# Patient Record
Sex: FEMALE | Race: BLACK OR AFRICAN AMERICAN | Employment: OTHER
[De-identification: names, ages, dates, MRNs, and addresses within clinical notes are randomized per-mention and may not be internally consistent; named-entity substitution may affect disease eponyms.]

---

## 2024-11-16 ENCOUNTER — APPOINTMENT (OUTPATIENT)
Facility: HOSPITAL | Age: 59
DRG: 871 | End: 2024-11-16
Payer: OTHER GOVERNMENT

## 2024-11-16 ENCOUNTER — HOSPITAL ENCOUNTER (INPATIENT)
Facility: HOSPITAL | Age: 59
LOS: 1 days | Discharge: OTHER INSTITUTION | DRG: 871 | End: 2024-11-18
Attending: EMERGENCY MEDICINE | Admitting: INTERNAL MEDICINE
Payer: OTHER GOVERNMENT

## 2024-11-16 DIAGNOSIS — R65.20 SEVERE SEPSIS (HCC): ICD-10-CM

## 2024-11-16 DIAGNOSIS — A41.9 SEVERE SEPSIS (HCC): ICD-10-CM

## 2024-11-16 DIAGNOSIS — J90 PLEURAL EFFUSION: Primary | ICD-10-CM

## 2024-11-16 LAB
ALBUMIN SERPL-MCNC: 3.5 G/DL (ref 3.4–5)
ALBUMIN/GLOB SERPL: 0.6 (ref 0.8–1.7)
ALP SERPL-CCNC: 255 U/L (ref 45–117)
ALT SERPL-CCNC: 29 U/L (ref 13–56)
AMPHET UR QL SCN: NEGATIVE
ANION GAP BLD CALC-SCNC: ABNORMAL MMOL/L (ref 10–20)
ANION GAP SERPL CALC-SCNC: 15 MMOL/L (ref 3–18)
APPEARANCE UR: CLEAR
ARTERIAL PATENCY WRIST A: POSITIVE
AST SERPL-CCNC: 17 U/L (ref 10–38)
BACTERIA URNS QL MICRO: ABNORMAL /HPF
BARBITURATES UR QL SCN: NEGATIVE
BASE DEFICIT BLD-SCNC: 2.1 MMOL/L
BASOPHILS # BLD: 0 K/UL (ref 0–0.1)
BASOPHILS NFR BLD: 0 % (ref 0–2)
BDY SITE: ABNORMAL
BENZODIAZ UR QL: NEGATIVE
BILIRUB SERPL-MCNC: 0.7 MG/DL (ref 0.2–1)
BILIRUB UR QL: NEGATIVE
BUN SERPL-MCNC: 14 MG/DL (ref 7–18)
BUN/CREAT SERPL: 17 (ref 12–20)
CA-I BLD-MCNC: 1.26 MMOL/L (ref 1.15–1.33)
CALCIUM SERPL-MCNC: 10.4 MG/DL (ref 8.5–10.1)
CANNABINOIDS UR QL SCN: NEGATIVE
CHLORIDE BLD-SCNC: 97 MMOL/L (ref 98–107)
CHLORIDE SERPL-SCNC: 93 MMOL/L (ref 100–111)
CK SERPL-CCNC: 68 U/L (ref 26–192)
CO2 BLD-SCNC: 22 MMOL/L (ref 22–29)
CO2 SERPL-SCNC: 24 MMOL/L (ref 21–32)
COCAINE UR QL SCN: NEGATIVE
COLOR UR: YELLOW
CREAT BLD-MCNC: 0.5 MG/DL (ref 0.6–1.3)
CREAT SERPL-MCNC: 0.81 MG/DL (ref 0.6–1.3)
DIFFERENTIAL METHOD BLD: ABNORMAL
EOSINOPHIL # BLD: 0 K/UL (ref 0–0.4)
EOSINOPHIL NFR BLD: 0 % (ref 0–5)
EPITH CASTS URNS QL MICRO: ABNORMAL /LPF (ref 0–5)
ERYTHROCYTE [DISTWIDTH] IN BLOOD BY AUTOMATED COUNT: 12 % (ref 11.6–14.5)
ETHANOL SERPL-MCNC: <3 MG/DL (ref 0–3)
FLUAV RNA SPEC QL NAA+PROBE: NOT DETECTED
FLUBV RNA SPEC QL NAA+PROBE: NOT DETECTED
GAS FLOW.O2 O2 DELIVERY SYS: ABNORMAL
GLOBULIN SER CALC-MCNC: 6.2 G/DL (ref 2–4)
GLUCOSE BLD STRIP.AUTO-MCNC: 305 MG/DL (ref 70–110)
GLUCOSE BLD-MCNC: 361 MG/DL (ref 74–99)
GLUCOSE SERPL-MCNC: 317 MG/DL (ref 74–99)
GLUCOSE UR STRIP.AUTO-MCNC: >1000 MG/DL
HCO3 BLD-SCNC: 22.6 MMOL/L (ref 21–28)
HCT VFR BLD AUTO: 41.6 % (ref 35–45)
HGB BLD-MCNC: 13.2 G/DL (ref 12–16)
HGB UR QL STRIP: NEGATIVE
IMM GRANULOCYTES # BLD AUTO: 0.4 K/UL (ref 0–0.04)
IMM GRANULOCYTES NFR BLD AUTO: 2 % (ref 0–0.5)
KETONES UR QL STRIP.AUTO: >80 MG/DL
LACTATE BLD-SCNC: 1.67 MMOL/L (ref 0.4–2)
LACTATE BLD-SCNC: 2.24 MMOL/L (ref 0.4–2)
LACTATE BLD-SCNC: 2.9 MMOL/L (ref 0.4–2)
LEUKOCYTE ESTERASE UR QL STRIP.AUTO: NEGATIVE
LIPASE SERPL-CCNC: 42 U/L (ref 13–75)
LYMPHOCYTES # BLD: 0.7 K/UL (ref 0.9–3.6)
LYMPHOCYTES NFR BLD: 4 % (ref 21–52)
Lab: NORMAL
MAGNESIUM SERPL-MCNC: 2.3 MG/DL (ref 1.6–2.6)
MCH RBC QN AUTO: 25.4 PG (ref 24–34)
MCHC RBC AUTO-ENTMCNC: 31.7 G/DL (ref 31–37)
MCV RBC AUTO: 80 FL (ref 78–100)
METHADONE UR QL: NEGATIVE
MONOCYTES # BLD: 1.3 K/UL (ref 0.05–1.2)
MONOCYTES NFR BLD: 7 % (ref 3–10)
NEUTS SEG # BLD: 16.2 K/UL (ref 1.8–8)
NEUTS SEG NFR BLD: 87 % (ref 40–73)
NITRITE UR QL STRIP.AUTO: NEGATIVE
NRBC # BLD: 0 K/UL (ref 0–0.01)
NRBC BLD-RTO: 0 PER 100 WBC
NT PRO BNP: 366 PG/ML (ref 0–900)
OPIATES UR QL: NEGATIVE
PCO2 BLD: 37.8 MMHG (ref 35–48)
PCP UR QL: NEGATIVE
PH BLD: 7.39 (ref 7.35–7.45)
PH UR STRIP: 5.5 (ref 5–8)
PLATELET # BLD AUTO: 587 K/UL (ref 135–420)
PMV BLD AUTO: 10.9 FL (ref 9.2–11.8)
PO2 BLD: 70 MMHG (ref 83–108)
POTASSIUM BLD-SCNC: 4.1 MMOL/L (ref 3.5–5.1)
POTASSIUM SERPL-SCNC: 4 MMOL/L (ref 3.5–5.5)
PROT SERPL-MCNC: 9.7 G/DL (ref 6.4–8.2)
PROT UR STRIP-MCNC: 100 MG/DL
RBC # BLD AUTO: 5.2 M/UL (ref 4.2–5.3)
RBC #/AREA URNS HPF: ABNORMAL /HPF (ref 0–5)
RBC MORPH BLD: ABNORMAL
SAO2 % BLD: 94 % (ref 94–98)
SARS-COV-2 RNA RESP QL NAA+PROBE: NOT DETECTED
SERVICE CMNT-IMP: ABNORMAL
SODIUM BLD-SCNC: 132 MMOL/L (ref 136–145)
SODIUM SERPL-SCNC: 132 MMOL/L (ref 136–145)
SOURCE: NORMAL
SP GR UR REFRACTOMETRY: >1.03 (ref 1–1.03)
SPECIMEN SITE: ABNORMAL
TROPONIN I SERPL HS-MCNC: 7 NG/L (ref 0–54)
UROBILINOGEN UR QL STRIP.AUTO: 1 EU/DL (ref 0.2–1)
WBC # BLD AUTO: 18.6 K/UL (ref 4.6–13.2)
WBC URNS QL MICRO: ABNORMAL /HPF (ref 0–5)

## 2024-11-16 PROCEDURE — 88305 TISSUE EXAM BY PATHOLOGIST: CPT

## 2024-11-16 PROCEDURE — 83036 HEMOGLOBIN GLYCOSYLATED A1C: CPT

## 2024-11-16 PROCEDURE — 84311 SPECTROPHOTOMETRY: CPT

## 2024-11-16 PROCEDURE — 87205 SMEAR GRAM STAIN: CPT

## 2024-11-16 PROCEDURE — 82150 ASSAY OF AMYLASE: CPT

## 2024-11-16 PROCEDURE — 80053 COMPREHEN METABOLIC PANEL: CPT

## 2024-11-16 PROCEDURE — 87040 BLOOD CULTURE FOR BACTERIA: CPT

## 2024-11-16 PROCEDURE — 93005 ELECTROCARDIOGRAM TRACING: CPT | Performed by: EMERGENCY MEDICINE

## 2024-11-16 PROCEDURE — 83615 LACTATE (LD) (LDH) ENZYME: CPT

## 2024-11-16 PROCEDURE — 84484 ASSAY OF TROPONIN QUANT: CPT

## 2024-11-16 PROCEDURE — 83735 ASSAY OF MAGNESIUM: CPT

## 2024-11-16 PROCEDURE — 82947 ASSAY GLUCOSE BLOOD QUANT: CPT

## 2024-11-16 PROCEDURE — 6360000002 HC RX W HCPCS: Performed by: PHYSICIAN ASSISTANT

## 2024-11-16 PROCEDURE — 81001 URINALYSIS AUTO W/SCOPE: CPT

## 2024-11-16 PROCEDURE — 87116 MYCOBACTERIA CULTURE: CPT

## 2024-11-16 PROCEDURE — 82077 ASSAY SPEC XCP UR&BREATH IA: CPT

## 2024-11-16 PROCEDURE — 6360000004 HC RX CONTRAST MEDICATION: Performed by: EMERGENCY MEDICINE

## 2024-11-16 PROCEDURE — 82330 ASSAY OF CALCIUM: CPT

## 2024-11-16 PROCEDURE — 87206 SMEAR FLUORESCENT/ACID STAI: CPT

## 2024-11-16 PROCEDURE — 96366 THER/PROPH/DIAG IV INF ADDON: CPT

## 2024-11-16 PROCEDURE — 96375 TX/PRO/DX INJ NEW DRUG ADDON: CPT

## 2024-11-16 PROCEDURE — 36600 WITHDRAWAL OF ARTERIAL BLOOD: CPT

## 2024-11-16 PROCEDURE — 74177 CT ABD & PELVIS W/CONTRAST: CPT

## 2024-11-16 PROCEDURE — 82962 GLUCOSE BLOOD TEST: CPT

## 2024-11-16 PROCEDURE — 82945 GLUCOSE OTHER FLUID: CPT

## 2024-11-16 PROCEDURE — 71275 CT ANGIOGRAPHY CHEST: CPT

## 2024-11-16 PROCEDURE — 80307 DRUG TEST PRSMV CHEM ANLYZR: CPT

## 2024-11-16 PROCEDURE — 83605 ASSAY OF LACTIC ACID: CPT

## 2024-11-16 PROCEDURE — 87102 FUNGUS ISOLATION CULTURE: CPT

## 2024-11-16 PROCEDURE — 83880 ASSAY OF NATRIURETIC PEPTIDE: CPT

## 2024-11-16 PROCEDURE — 0W9B3ZZ DRAINAGE OF LEFT PLEURAL CAVITY, PERCUTANEOUS APPROACH: ICD-10-PCS | Performed by: EMERGENCY MEDICINE

## 2024-11-16 PROCEDURE — 2580000003 HC RX 258: Performed by: PHYSICIAN ASSISTANT

## 2024-11-16 PROCEDURE — 88112 CYTOPATH CELL ENHANCE TECH: CPT

## 2024-11-16 PROCEDURE — 85025 COMPLETE CBC W/AUTO DIFF WBC: CPT

## 2024-11-16 PROCEDURE — 84295 ASSAY OF SERUM SODIUM: CPT

## 2024-11-16 PROCEDURE — 87636 SARSCOV2 & INF A&B AMP PRB: CPT

## 2024-11-16 PROCEDURE — 82803 BLOOD GASES ANY COMBINATION: CPT

## 2024-11-16 PROCEDURE — 83986 ASSAY PH BODY FLUID NOS: CPT

## 2024-11-16 PROCEDURE — 82550 ASSAY OF CK (CPK): CPT

## 2024-11-16 PROCEDURE — 85014 HEMATOCRIT: CPT

## 2024-11-16 PROCEDURE — 84132 ASSAY OF SERUM POTASSIUM: CPT

## 2024-11-16 PROCEDURE — 71045 X-RAY EXAM CHEST 1 VIEW: CPT

## 2024-11-16 PROCEDURE — 82042 OTHER SOURCE ALBUMIN QUAN EA: CPT

## 2024-11-16 PROCEDURE — 84478 ASSAY OF TRIGLYCERIDES: CPT

## 2024-11-16 PROCEDURE — 99285 EMERGENCY DEPT VISIT HI MDM: CPT

## 2024-11-16 PROCEDURE — 87070 CULTURE OTHR SPECIMN AEROBIC: CPT

## 2024-11-16 PROCEDURE — 96365 THER/PROPH/DIAG IV INF INIT: CPT

## 2024-11-16 PROCEDURE — 83690 ASSAY OF LIPASE: CPT

## 2024-11-16 PROCEDURE — 84157 ASSAY OF PROTEIN OTHER: CPT

## 2024-11-16 RX ORDER — 0.9 % SODIUM CHLORIDE 0.9 %
30 INTRAVENOUS SOLUTION INTRAVENOUS ONCE
Status: DISCONTINUED | OUTPATIENT
Start: 2024-11-16 | End: 2024-11-16

## 2024-11-16 RX ORDER — IOPAMIDOL 755 MG/ML
100 INJECTION, SOLUTION INTRAVASCULAR
Status: COMPLETED | OUTPATIENT
Start: 2024-11-16 | End: 2024-11-16

## 2024-11-16 RX ORDER — 0.9 % SODIUM CHLORIDE 0.9 %
1000 INTRAVENOUS SOLUTION INTRAVENOUS ONCE
Status: COMPLETED | OUTPATIENT
Start: 2024-11-16 | End: 2024-11-16

## 2024-11-16 RX ORDER — 0.9 % SODIUM CHLORIDE 0.9 %
1000 INTRAVENOUS SOLUTION INTRAVENOUS ONCE
Status: COMPLETED | OUTPATIENT
Start: 2024-11-16 | End: 2024-11-17

## 2024-11-16 RX ORDER — LEVOFLOXACIN 5 MG/ML
750 INJECTION, SOLUTION INTRAVENOUS ONCE
Status: COMPLETED | OUTPATIENT
Start: 2024-11-16 | End: 2024-11-16

## 2024-11-16 RX ADMIN — CEFTRIAXONE 1000 MG: 1 INJECTION, POWDER, FOR SOLUTION INTRAMUSCULAR; INTRAVENOUS at 20:15

## 2024-11-16 RX ADMIN — IOPAMIDOL 80 ML: 755 INJECTION, SOLUTION INTRAVENOUS at 21:04

## 2024-11-16 RX ADMIN — SODIUM CHLORIDE 1000 ML: 9 INJECTION, SOLUTION INTRAVENOUS at 19:30

## 2024-11-16 RX ADMIN — LEVOFLOXACIN 750 MG: 5 INJECTION, SOLUTION INTRAVENOUS at 20:22

## 2024-11-16 RX ADMIN — SODIUM CHLORIDE 1000 ML: 9 INJECTION, SOLUTION INTRAVENOUS at 23:56

## 2024-11-16 ASSESSMENT — PAIN - FUNCTIONAL ASSESSMENT
PAIN_FUNCTIONAL_ASSESSMENT: NONE - DENIES PAIN
PAIN_FUNCTIONAL_ASSESSMENT: NONE - DENIES PAIN

## 2024-11-16 NOTE — ED TRIAGE NOTES
PT BIBA c/o SOB, muscle spasms to stomach, and generalized fatigue. Per EMS, pt has not been eating right for three weeks, reports no pain except muscle spasms. Pt talking in full sentences and NAD. Per EMS,  but no hx of T2D.

## 2024-11-16 NOTE — ED PROVIDER NOTES
Facility-Administered Medications   Medication Dose Route Frequency Provider Last Rate Last Admin    sodium chloride 0.9 % bolus 1,000 mL  1,000 mL IntraVENous Once Thelma Mcintyre PA-C 983.6 mL/hr at 11/16/24 1930 1,000 mL at 11/16/24 1930    sodium chloride 0.9 % bolus 1,000 mL  1,000 mL IntraVENous Once Thelma Mcintyre PA-C        levoFLOXacin (LEVAQUIN) 750 MG/150ML infusion 750 mg  750 mg IntraVENous Once Thelma Mcintyre PA-C 100 mL/hr at 11/16/24 2022 750 mg at 11/16/24 2022     No current outpatient medications on file.          PHYSICAL EXAM      Vitals:    11/16/24 1815 11/16/24 1819 11/16/24 1949   BP:  (!) 164/111 (!) 136/91   Pulse:  (!) 127 (!) 121   Resp:  26 (!) 33   Temp:  97.5 °F (36.4 °C)    TempSrc:  Oral    SpO2:  96% 97%   Weight: 65.8 kg (145 lb)     Height: 1.753 m (5' 9\")       Physical Exam  Vitals and nursing note reviewed.   Constitutional:       General: She is not in acute distress.     Appearance: Normal appearance. She is ill-appearing.   HENT:      Head: Normocephalic and atraumatic.      Nose: Nose normal. No congestion.      Mouth/Throat:      Mouth: Mucous membranes are moist.      Pharynx: Oropharynx is clear.   Eyes:      Extraocular Movements: Extraocular movements intact.      Pupils: Pupils are equal, round, and reactive to light.   Cardiovascular:      Rate and Rhythm: Regular rhythm. Tachycardia present.      Pulses: Normal pulses.      Heart sounds: Normal heart sounds. No murmur heard.  Pulmonary:      Effort: Pulmonary effort is normal. Tachypnea present. No respiratory distress.      Breath sounds: Normal breath sounds. No wheezing or rales.      Comments: +kussmaul breathing  Abdominal:      General: Abdomen is flat. Bowel sounds are normal.      Palpations: Abdomen is soft.      Tenderness: There is no abdominal tenderness.   Musculoskeletal:         General: Normal range of motion.      Cervical back: Normal range of motion and neck supple. No rigidity.      Right

## 2024-11-17 ENCOUNTER — APPOINTMENT (OUTPATIENT)
Facility: HOSPITAL | Age: 59
DRG: 871 | End: 2024-11-17
Payer: OTHER GOVERNMENT

## 2024-11-17 PROBLEM — R73.9 HYPERGLYCEMIA: Status: ACTIVE | Noted: 2024-11-17

## 2024-11-17 PROBLEM — R06.09 DOE (DYSPNEA ON EXERTION): Status: ACTIVE | Noted: 2024-11-17

## 2024-11-17 PROBLEM — J90 PLEURAL EFFUSION ON RIGHT: Status: ACTIVE | Noted: 2024-11-17

## 2024-11-17 PROBLEM — Z87.891 FORMER SMOKER, STOPPED SMOKING IN DISTANT PAST: Status: ACTIVE | Noted: 2024-11-17

## 2024-11-17 LAB
ALBUMIN FLD-MCNC: 3 G/DL
ALP SERPL-CCNC: 184 U/L (ref 45–117)
AMYLASE FLD-CCNC: 77 U/L
BODY FLD TYPE: NORMAL
CA-I SERPL-SCNC: 1.14 MMOL/L (ref 1.12–1.32)
CHOLEST FLD-MCNC: 132 MG/DL
CRP SERPL-MCNC: 20.4 MG/DL (ref 0–0.3)
ERYTHROCYTE [SEDIMENTATION RATE] IN BLOOD: 61 MM/HR (ref 0–30)
EST. AVERAGE GLUCOSE BLD GHB EST-MCNC: 229 MG/DL
EST. AVERAGE GLUCOSE BLD GHB EST-MCNC: 237 MG/DL
GLUCOSE BLD STRIP.AUTO-MCNC: 206 MG/DL (ref 70–110)
GLUCOSE BLD STRIP.AUTO-MCNC: 227 MG/DL (ref 70–110)
GLUCOSE BLD STRIP.AUTO-MCNC: 243 MG/DL (ref 70–110)
GLUCOSE BLD STRIP.AUTO-MCNC: 251 MG/DL (ref 70–110)
GLUCOSE BLD STRIP.AUTO-MCNC: 252 MG/DL (ref 70–110)
GLUCOSE FLD-MCNC: 192 MG/DL
HBA1C MFR BLD: 9.6 % (ref 4.2–5.6)
HBA1C MFR BLD: 9.9 % (ref 4.2–5.6)
LACTATE SERPL-SCNC: 1.2 MMOL/L (ref 0.4–2)
LACTATE SERPL-SCNC: 1.5 MMOL/L (ref 0.4–2)
LDH FLD L TO P-CCNC: 1114 U/L
LDH SERPL L TO P-CCNC: 166 U/L (ref 81–234)
LDH SERPL L TO P-CCNC: 180 U/L (ref 81–234)
PH FLD: 7.5
PREALB SERPL-MCNC: 8.6 MG/DL (ref 20–40)
PROCALCITONIN SERPL-MCNC: 0.18 NG/ML
PROT FLD-MCNC: 6.2 G/DL
SPECIMEN SOURCE FLD: NORMAL
TRIGL FLD-MCNC: 107 MG/DL

## 2024-11-17 PROCEDURE — 88305 TISSUE EXAM BY PATHOLOGIST: CPT

## 2024-11-17 PROCEDURE — 6370000000 HC RX 637 (ALT 250 FOR IP): Performed by: INTERNAL MEDICINE

## 2024-11-17 PROCEDURE — 71045 X-RAY EXAM CHEST 1 VIEW: CPT

## 2024-11-17 PROCEDURE — 85652 RBC SED RATE AUTOMATED: CPT

## 2024-11-17 PROCEDURE — 82962 GLUCOSE BLOOD TEST: CPT

## 2024-11-17 PROCEDURE — 86480 TB TEST CELL IMMUN MEASURE: CPT

## 2024-11-17 PROCEDURE — 6370000000 HC RX 637 (ALT 250 FOR IP): Performed by: HOSPITALIST

## 2024-11-17 PROCEDURE — 86140 C-REACTIVE PROTEIN: CPT

## 2024-11-17 PROCEDURE — 84145 PROCALCITONIN (PCT): CPT

## 2024-11-17 PROCEDURE — 88112 CYTOPATH CELL ENHANCE TECH: CPT

## 2024-11-17 PROCEDURE — 82330 ASSAY OF CALCIUM: CPT

## 2024-11-17 PROCEDURE — 88185 FLOWCYTOMETRY/TC ADD-ON: CPT

## 2024-11-17 PROCEDURE — 84134 ASSAY OF PREALBUMIN: CPT

## 2024-11-17 PROCEDURE — 1100000003 HC PRIVATE W/ TELEMETRY

## 2024-11-17 PROCEDURE — 83615 LACTATE (LD) (LDH) ENZYME: CPT

## 2024-11-17 PROCEDURE — 2580000003 HC RX 258: Performed by: INTERNAL MEDICINE

## 2024-11-17 PROCEDURE — 36415 COLL VENOUS BLD VENIPUNCTURE: CPT

## 2024-11-17 PROCEDURE — 84075 ASSAY ALKALINE PHOSPHATASE: CPT

## 2024-11-17 PROCEDURE — 83036 HEMOGLOBIN GLYCOSYLATED A1C: CPT

## 2024-11-17 PROCEDURE — 88184 FLOWCYTOMETRY/ TC 1 MARKER: CPT

## 2024-11-17 PROCEDURE — 83605 ASSAY OF LACTIC ACID: CPT

## 2024-11-17 PROCEDURE — 6360000002 HC RX W HCPCS: Performed by: INTERNAL MEDICINE

## 2024-11-17 RX ORDER — MIRTAZAPINE 15 MG/1
7.5 TABLET, ORALLY DISINTEGRATING ORAL ONCE
Status: DISCONTINUED | OUTPATIENT
Start: 2024-11-17 | End: 2024-11-19 | Stop reason: HOSPADM

## 2024-11-17 RX ORDER — ACETAMINOPHEN 325 MG/1
650 TABLET ORAL EVERY 6 HOURS PRN
Status: DISCONTINUED | OUTPATIENT
Start: 2024-11-17 | End: 2024-11-19 | Stop reason: HOSPADM

## 2024-11-17 RX ORDER — DEXTROSE MONOHYDRATE 100 MG/ML
INJECTION, SOLUTION INTRAVENOUS CONTINUOUS PRN
Status: DISCONTINUED | OUTPATIENT
Start: 2024-11-17 | End: 2024-11-19 | Stop reason: HOSPADM

## 2024-11-17 RX ORDER — MECOBALAMIN 5000 MCG
10 TABLET,DISINTEGRATING ORAL NIGHTLY
Status: DISCONTINUED | OUTPATIENT
Start: 2024-11-17 | End: 2024-11-19 | Stop reason: HOSPADM

## 2024-11-17 RX ORDER — LEVOFLOXACIN 5 MG/ML
750 INJECTION, SOLUTION INTRAVENOUS EVERY 24 HOURS
Status: DISCONTINUED | OUTPATIENT
Start: 2024-11-17 | End: 2024-11-19 | Stop reason: HOSPADM

## 2024-11-17 RX ORDER — ENOXAPARIN SODIUM 100 MG/ML
40 INJECTION SUBCUTANEOUS DAILY
Status: DISCONTINUED | OUTPATIENT
Start: 2024-11-17 | End: 2024-11-19 | Stop reason: HOSPADM

## 2024-11-17 RX ORDER — SODIUM CHLORIDE 9 MG/ML
INJECTION, SOLUTION INTRAVENOUS PRN
Status: DISCONTINUED | OUTPATIENT
Start: 2024-11-17 | End: 2024-11-19 | Stop reason: HOSPADM

## 2024-11-17 RX ORDER — SODIUM CHLORIDE 0.9 % (FLUSH) 0.9 %
5-40 SYRINGE (ML) INJECTION PRN
Status: DISCONTINUED | OUTPATIENT
Start: 2024-11-17 | End: 2024-11-19 | Stop reason: HOSPADM

## 2024-11-17 RX ORDER — MIRTAZAPINE 15 MG/1
15 TABLET, ORALLY DISINTEGRATING ORAL NIGHTLY
Status: DISCONTINUED | OUTPATIENT
Start: 2024-11-17 | End: 2024-11-19 | Stop reason: HOSPADM

## 2024-11-17 RX ORDER — INSULIN LISPRO 100 [IU]/ML
0-8 INJECTION, SOLUTION INTRAVENOUS; SUBCUTANEOUS
Status: DISCONTINUED | OUTPATIENT
Start: 2024-11-17 | End: 2024-11-19 | Stop reason: HOSPADM

## 2024-11-17 RX ORDER — GLUCAGON 1 MG/ML
1 KIT INJECTION PRN
Status: DISCONTINUED | OUTPATIENT
Start: 2024-11-17 | End: 2024-11-19 | Stop reason: HOSPADM

## 2024-11-17 RX ORDER — SODIUM CHLORIDE 0.9 % (FLUSH) 0.9 %
5-40 SYRINGE (ML) INJECTION EVERY 12 HOURS SCHEDULED
Status: DISCONTINUED | OUTPATIENT
Start: 2024-11-17 | End: 2024-11-19 | Stop reason: HOSPADM

## 2024-11-17 RX ORDER — ACETAMINOPHEN 650 MG/1
650 SUPPOSITORY RECTAL EVERY 6 HOURS PRN
Status: DISCONTINUED | OUTPATIENT
Start: 2024-11-17 | End: 2024-11-19 | Stop reason: HOSPADM

## 2024-11-17 RX ADMIN — MIRTAZAPINE 15 MG: 15 TABLET, ORALLY DISINTEGRATING ORAL at 20:26

## 2024-11-17 RX ADMIN — Medication 10 MG: at 20:25

## 2024-11-17 RX ADMIN — LEVOFLOXACIN 750 MG: 750 INJECTION, SOLUTION INTRAVENOUS at 20:36

## 2024-11-17 RX ADMIN — SODIUM CHLORIDE, PRESERVATIVE FREE 10 ML: 5 INJECTION INTRAVENOUS at 20:25

## 2024-11-17 RX ADMIN — SALINE NASAL SPRAY 1 SPRAY: 1.5 SOLUTION NASAL at 17:57

## 2024-11-17 RX ADMIN — ENOXAPARIN SODIUM 40 MG: 100 INJECTION SUBCUTANEOUS at 09:05

## 2024-11-17 RX ADMIN — SODIUM CHLORIDE: 9 INJECTION, SOLUTION INTRAVENOUS at 20:36

## 2024-11-17 RX ADMIN — INSULIN LISPRO 4 UNITS: 100 INJECTION, SOLUTION INTRAVENOUS; SUBCUTANEOUS at 20:31

## 2024-11-17 RX ADMIN — INSULIN LISPRO 2 UNITS: 100 INJECTION, SOLUTION INTRAVENOUS; SUBCUTANEOUS at 17:57

## 2024-11-17 RX ADMIN — SODIUM CHLORIDE, PRESERVATIVE FREE 10 ML: 5 INJECTION INTRAVENOUS at 09:05

## 2024-11-17 ASSESSMENT — PAIN SCALES - GENERAL: PAINLEVEL_OUTOF10: 0

## 2024-11-17 NOTE — ED PROVIDER NOTES
consent obtained.    Indications: Large right-sided pleural effusion, diagnostic and therapeutic.    Approach: Right, posterior, ultrasound-guided.  Fluid: 800 cc bloody fluid obtained.  Sent to lab for testing  Postprocedure x-ray shows no tracheal deviation, no pneumothorax.    Consult Note:  Time: 12:13 AM EST   Physician: Dr. Pathak  Specialty: Hospitalist  Discussion: I discussed the patient's presentation, vitals, labs and imaging if available.  He accepts admission    Positive and negative test results were discussed. My clinical assessment and recommendations were discussed. Patient agrees with the plan of admission.  All questions were answered and the patient in in agreement with plan.    Core Measures:  For Hospitalized Patients:    Hospitalization Decision Time:  The decision to hospitalize the patient was made by Vincent Rios MD @ at 1900 on 11/16/2024    Patient is being admitted to the hospital by Dr. Pathak. The results of their tests and reasons for their admission have been discussed with them and/or available family. They convey agreement and understanding for the need to be admitted and for their admission diagnosis.      CONDITIONS ON ADMISSION:  Sepsis is  present at the time of admission. Deep Vein Thrombosis is not present at the time of admission. Thrombosis is not present at the time of admission. Urinary Tract Infection is not present at the time of admission. Pneumonia is  present at the time of admission. MRSA is not present at the time of admission. Wound infection is not present at the time of admission. Pressure Ulcer is not present at the time of admission.      CLINICAL IMPRESSION:  1. Pleural effusion    2. Severe sepsis (HCC)        PLAN:  Admit          Vincent Rios MD  11/17/24 0255

## 2024-11-17 NOTE — ED NOTES
TRANSFER - OUT REPORT:    Verbal report given to SUNDAY Avina on Sisi Samayoa  being transferred to 356 for routine progression of patient care       Report consisted of patient's Situation, Background, Assessment and   Recommendations(SBAR).     Information from the following report(s) ED SBAR was reviewed with the receiving nurse.    [place] Fall Assessment:                           Lines:   Peripheral IV 11/16/24 Left;Posterior Wrist (Active)       Peripheral IV 11/16/24 Right Antecubital (Active)        Opportunity for questions and clarification was provided.      Patient transported with:  Registered Nurse

## 2024-11-17 NOTE — CARE COORDINATION
Patient independent prior to admission. Patient lives at home with. Patient has  as payor and has PCP through [facility]. Patient to be followed by pulmonology in acute setting. Unsure needs at this time. SW to follow to assist with DC planning needs.   11/17/24 0913   Service Assessment   Patient Orientation Alert and Oriented   Cognition Alert   History Provided By Patient   Primary Caregiver Self   Support Systems Children   Patient's Healthcare Decision Maker is: Named in Scanned ACP Document   PCP Verified by CM Yes   Last Visit to PCP Within last 3 months   Prior Functional Level Independent in ADLs/IADLs   Current Functional Level Independent in ADLs/IADLs   Can patient return to prior living arrangement Yes   Ability to make needs known: Good   Family able to assist with home care needs: Yes   Would you like for me to discuss the discharge plan with any other family members/significant others, and if so, who? No   Financial Resources Other (Comment)   Community Resources None   CM/SW Referral Other (see comment)   Social/Functional History   Lives With Son   Type of Home House   Occupation Full time employment   Discharge Planning   Type of Residence House   Living Arrangements Children   Current Services Prior To Admission None   Potential Assistance Needed N/A   DME Ordered? No   Potential Assistance Purchasing Medications No   Type of Home Care Services None   Patient expects to be discharged to: House   Follow Up Appointment: Best Day/Time  Monday AM   One/Two Story Residence One story   History of falls? 0   Services At/After Discharge   Transition of Care Consult (CM Consult) N/A;Discharge Planning   Services At/After Discharge Community Resources    Resource Information Provided? Yes   Mode of Transport at Discharge Other (see comment)   Confirm Follow Up Transport Family   Condition of Participation: Discharge Planning   The Plan for Transition of Care is related to the following

## 2024-11-17 NOTE — H&P
History & Physical    Patient: Sisi Samayoa MRN: [id number]  [facility]: [id number]    YOB: 1965  Age: 59 y.o.  Sex: female      DOA: 11/16/2024    Chief Complaint:   Chief Complaint   Patient presents with    Shortness of Breath    Illness       Active Hospital Problems    Diagnosis Date Noted    Pleural effusion on right [J90] 11/17/2024       HPI:     Sisi Samayoa is a 59 y.o.  female w/o significant PMH who presents with WALLER.    Ms. Samayoa has had 2 weeks of progressive WALLER.  She has struggled to eat and her oral intake has declined as well. She has some degree of unintentional wt loss.     Social Hx: Pt is  and lives independently with her son.  She assigned her son as her surrogate. Past smoker but not currently, denies ETOH and illicit drugs.    FHX: Maternal, HTN; paternal, HTN, heart disease.    In the ED: pt had a CTA chest without PE but with very large pleural effusion with mediastinal shift. ED performed thoracentesis removing 800cc of blood fluid.  I confirmed this was sent for cx and cytology. Pulmonology was consulted by ED. Levaquin and rocephin were given for sepsis as well as 30cc/kg IVF bolus.       History reviewed. No pertinent past medical history.    History reviewed. No pertinent surgical history.    History reviewed. No pertinent family history.    Social History     Socioeconomic History    Marital status:      Spouse name: None    Number of children: None    Years of education: None    Highest education level: None   Tobacco Use    Smoking status: Never    Smokeless tobacco: Never   Substance and Sexual Activity    Alcohol use: Never    Drug use: Never       Prior to Admission medications    Not on File       No Known Allergies      Review of Systems  GENERAL: no weakness and + fatigue. Negative f/c.  HEENT: No change in vision, no earache, tinnitus, sore throat or sinus congestion.   NECK: No pain or stiffness.   PULMONARY: + shortness of breath/WALLER,

## 2024-11-17 NOTE — ED NOTES
Signing off on this pt. Report given to SUNDAY Be. Pt has two PIV, 22 L wrist and 20g R AC. Pt is receiving a second fluid bolus.

## 2024-11-18 ENCOUNTER — HOSPITAL ENCOUNTER (INPATIENT)
Facility: HOSPITAL | Age: 59
Discharge: HOME | DRG: 871 | End: 2024-11-21
Payer: OTHER GOVERNMENT

## 2024-11-18 ENCOUNTER — APPOINTMENT (OUTPATIENT)
Facility: HOSPITAL | Age: 59
DRG: 871 | End: 2024-11-18
Payer: OTHER GOVERNMENT

## 2024-11-18 VITALS
TEMPERATURE: 97.9 F | RESPIRATION RATE: 18 BRPM | OXYGEN SATURATION: 100 % | DIASTOLIC BLOOD PRESSURE: 71 MMHG | BODY MASS INDEX: 22.17 KG/M2 | HEIGHT: 69 IN | HEART RATE: 111 BPM | SYSTOLIC BLOOD PRESSURE: 123 MMHG | WEIGHT: 149.69 LBS

## 2024-11-18 PROBLEM — Z87.09 HISTORY OF PNEUMOTHORAX: Status: ACTIVE | Noted: 2024-11-18

## 2024-11-18 PROBLEM — N80.B6: Status: ACTIVE | Noted: 2024-11-18

## 2024-11-18 LAB
ALBUMIN SERPL-MCNC: 2.2 G/DL (ref 3.4–5)
ALBUMIN/GLOB SERPL: 0.6 (ref 0.8–1.7)
ALP SERPL-CCNC: 142 U/L (ref 45–117)
ALT SERPL-CCNC: 19 U/L (ref 13–56)
ANION GAP SERPL CALC-SCNC: 7 MMOL/L (ref 3–18)
AST SERPL-CCNC: 13 U/L (ref 10–38)
BILIRUB DIRECT SERPL-MCNC: 0.2 MG/DL (ref 0–0.2)
BILIRUB SERPL-MCNC: 0.5 MG/DL (ref 0.2–1)
BUN SERPL-MCNC: 10 MG/DL (ref 7–18)
BUN/CREAT SERPL: 20 (ref 12–20)
CALCIUM SERPL-MCNC: 8.7 MG/DL (ref 8.5–10.1)
CHLORIDE SERPL-SCNC: 100 MMOL/L (ref 100–111)
CO2 SERPL-SCNC: 28 MMOL/L (ref 21–32)
CREAT SERPL-MCNC: 0.51 MG/DL (ref 0.6–1.3)
CYTOLOGY-NON GYN: NORMAL
EKG ATRIAL RATE: 126 BPM
EKG DIAGNOSIS: NORMAL
EKG P AXIS: 69 DEGREES
EKG P-R INTERVAL: 100 MS
EKG Q-T INTERVAL: 306 MS
EKG QRS DURATION: 66 MS
EKG QTC CALCULATION (BAZETT): 443 MS
EKG R AXIS: 36 DEGREES
EKG T AXIS: -55 DEGREES
EKG VENTRICULAR RATE: 126 BPM
ERYTHROCYTE [DISTWIDTH] IN BLOOD BY AUTOMATED COUNT: 11.9 % (ref 11.6–14.5)
GLOBULIN SER CALC-MCNC: 3.9 G/DL (ref 2–4)
GLUCOSE BLD STRIP.AUTO-MCNC: 161 MG/DL (ref 70–110)
GLUCOSE BLD STRIP.AUTO-MCNC: 211 MG/DL (ref 70–110)
GLUCOSE BLD STRIP.AUTO-MCNC: 226 MG/DL (ref 70–110)
GLUCOSE BLD STRIP.AUTO-MCNC: 253 MG/DL (ref 70–110)
GLUCOSE SERPL-MCNC: 152 MG/DL (ref 74–99)
HCT VFR BLD AUTO: 28.8 % (ref 35–45)
HGB BLD-MCNC: 9.2 G/DL (ref 12–16)
INR PPP: 1.3 (ref 0.9–1.1)
L PNEUMO AG UR QL IA: NEGATIVE
LACTATE SERPL-SCNC: 1.1 MMOL/L (ref 0.4–2)
LACTATE SERPL-SCNC: 1.2 MMOL/L (ref 0.4–2)
LDH SERPL L TO P-CCNC: 161 U/L (ref 81–234)
MAGNESIUM SERPL-MCNC: 1.8 MG/DL (ref 1.6–2.6)
MCH RBC QN AUTO: 25.3 PG (ref 24–34)
MCHC RBC AUTO-ENTMCNC: 31.9 G/DL (ref 31–37)
MCV RBC AUTO: 79.3 FL (ref 78–100)
NRBC # BLD: 0 K/UL (ref 0–0.01)
NRBC BLD-RTO: 0 PER 100 WBC
PLATELET # BLD AUTO: 522 K/UL (ref 135–420)
PMV BLD AUTO: 9.5 FL (ref 9.2–11.8)
POTASSIUM SERPL-SCNC: 2.9 MMOL/L (ref 3.5–5.5)
PROT SERPL-MCNC: 6.1 G/DL (ref 6.4–8.2)
PROT SERPL-MCNC: 6.2 G/DL (ref 6.4–8.2)
PROTHROMBIN TIME: 16 SEC (ref 11.9–14.9)
RBC # BLD AUTO: 3.63 M/UL (ref 4.2–5.3)
S PNEUM AG UR QL: NEGATIVE
SODIUM SERPL-SCNC: 135 MMOL/L (ref 136–145)
WBC # BLD AUTO: 13.3 K/UL (ref 4.6–13.2)

## 2024-11-18 PROCEDURE — 82962 GLUCOSE BLOOD TEST: CPT

## 2024-11-18 PROCEDURE — 85610 PROTHROMBIN TIME: CPT

## 2024-11-18 PROCEDURE — 97162 PT EVAL MOD COMPLEX 30 MIN: CPT

## 2024-11-18 PROCEDURE — 2580000003 HC RX 258: Performed by: INTERNAL MEDICINE

## 2024-11-18 PROCEDURE — 83735 ASSAY OF MAGNESIUM: CPT

## 2024-11-18 PROCEDURE — 83605 ASSAY OF LACTIC ACID: CPT

## 2024-11-18 PROCEDURE — 87449 NOS EACH ORGANISM AG IA: CPT

## 2024-11-18 PROCEDURE — 71045 X-RAY EXAM CHEST 1 VIEW: CPT

## 2024-11-18 PROCEDURE — 6360000002 HC RX W HCPCS: Performed by: INTERNAL MEDICINE

## 2024-11-18 PROCEDURE — 83615 LACTATE (LD) (LDH) ENZYME: CPT

## 2024-11-18 PROCEDURE — 6370000000 HC RX 637 (ALT 250 FOR IP): Performed by: INTERNAL MEDICINE

## 2024-11-18 PROCEDURE — 80048 BASIC METABOLIC PNL TOTAL CA: CPT

## 2024-11-18 PROCEDURE — 84155 ASSAY OF PROTEIN SERUM: CPT

## 2024-11-18 PROCEDURE — 80076 HEPATIC FUNCTION PANEL: CPT

## 2024-11-18 PROCEDURE — 36415 COLL VENOUS BLD VENIPUNCTURE: CPT

## 2024-11-18 PROCEDURE — 1100000003 HC PRIVATE W/ TELEMETRY

## 2024-11-18 PROCEDURE — 85027 COMPLETE CBC AUTOMATED: CPT

## 2024-11-18 RX ORDER — POTASSIUM CHLORIDE 1500 MG/1
40 TABLET, EXTENDED RELEASE ORAL
Status: COMPLETED | OUTPATIENT
Start: 2024-11-18 | End: 2024-11-18

## 2024-11-18 RX ORDER — LEVOFLOXACIN 750 MG/1
750 TABLET, FILM COATED ORAL DAILY
Qty: 10 TABLET | Refills: 0
Start: 2024-11-18 | End: 2024-11-28

## 2024-11-18 RX ORDER — SODIUM CHLORIDE 9 MG/ML
INJECTION, SOLUTION INTRAVENOUS PRN
Status: DISCONTINUED | OUTPATIENT
Start: 2024-11-18 | End: 2024-11-19 | Stop reason: HOSPADM

## 2024-11-18 RX ORDER — SODIUM CHLORIDE 0.9 % (FLUSH) 0.9 %
5-40 SYRINGE (ML) INJECTION EVERY 12 HOURS SCHEDULED
Status: DISCONTINUED | OUTPATIENT
Start: 2024-11-18 | End: 2024-11-19 | Stop reason: HOSPADM

## 2024-11-18 RX ORDER — SODIUM CHLORIDE 0.9 % (FLUSH) 0.9 %
5-40 SYRINGE (ML) INJECTION PRN
Status: DISCONTINUED | OUTPATIENT
Start: 2024-11-18 | End: 2024-11-19 | Stop reason: HOSPADM

## 2024-11-18 RX ORDER — INSULIN LISPRO 100 [IU]/ML
0-8 INJECTION, SOLUTION INTRAVENOUS; SUBCUTANEOUS
Qty: 10 ML | Refills: 0
Start: 2024-11-18

## 2024-11-18 RX ORDER — MAGNESIUM SULFATE IN WATER 40 MG/ML
2000 INJECTION, SOLUTION INTRAVENOUS ONCE
Status: COMPLETED | OUTPATIENT
Start: 2024-11-18 | End: 2024-11-18

## 2024-11-18 RX ADMIN — POTASSIUM CHLORIDE 40 MEQ: 1500 TABLET, EXTENDED RELEASE ORAL at 03:23

## 2024-11-18 RX ADMIN — MAGNESIUM SULFATE HEPTAHYDRATE 2000 MG: 40 INJECTION, SOLUTION INTRAVENOUS at 03:27

## 2024-11-18 RX ADMIN — POTASSIUM CHLORIDE 40 MEQ: 1500 TABLET, EXTENDED RELEASE ORAL at 11:55

## 2024-11-18 RX ADMIN — INSULIN LISPRO 2 UNITS: 100 INJECTION, SOLUTION INTRAVENOUS; SUBCUTANEOUS at 21:00

## 2024-11-18 RX ADMIN — INSULIN LISPRO 2 UNITS: 100 INJECTION, SOLUTION INTRAVENOUS; SUBCUTANEOUS at 12:02

## 2024-11-18 RX ADMIN — SODIUM CHLORIDE, PRESERVATIVE FREE 10 ML: 5 INJECTION INTRAVENOUS at 11:57

## 2024-11-18 RX ADMIN — SODIUM CHLORIDE, PRESERVATIVE FREE 10 ML: 5 INJECTION INTRAVENOUS at 08:43

## 2024-11-18 RX ADMIN — INSULIN LISPRO 4 UNITS: 100 INJECTION, SOLUTION INTRAVENOUS; SUBCUTANEOUS at 16:27

## 2024-11-18 RX ADMIN — POTASSIUM CHLORIDE 40 MEQ: 1500 TABLET, EXTENDED RELEASE ORAL at 08:43

## 2024-11-18 ASSESSMENT — PAIN SCALES - GENERAL: PAINLEVEL_OUTOF10: 0

## 2024-11-18 NOTE — PLAN OF CARE
Problem: Discharge Planning  Goal: Discharge to home or other facility with appropriate resources  11/18/2024 1400 by Sisi Dickerson RN  Outcome: Progressing  11/18/2024 0055 by Prudence Cleaning RN  Outcome: Progressing     Problem: Safety - Adult  Goal: Free from fall injury  11/18/2024 1400 by Sisi Dickerson RN  Outcome: Progressing  11/18/2024 0055 by Prudence Cleaning RN  Outcome: Progressing     Problem: Pain  Goal: Verbalizes/displays adequate comfort level or baseline comfort level  11/18/2024 1400 by Sisi Dickerson RN  Outcome: Progressing  11/18/2024 0055 by Prudence Cleaning RN  Outcome: Progressing

## 2024-11-18 NOTE — DISCHARGE SUMMARY
PERRLA, EOMI. Anicteric sclerae.  Lungs:  Decreased breath sounds right lung.  Heart:  Regular  rhythm,  No murmur, No Rubs, No Gallops  Abdomen: Soft, Non distended, Non tender. +Bowel sounds,   Extremities: No c/c/e  Psych:   Not anxious or agitated.  Neurologic:  No acute neurological deficits.                                     Admission HPI :   Sisi Samayoa is a 59 y.o.  female who presents with AWLLER.     Ms. Samayoa has had 2 weeks of progressive WALLER.  She has struggled to eat and her oral intake has declined as well. She has some degree of unintentional wt loss.     H/o right catamenial pneumothorax or thoracic endometriosis which required some form of chest tube placement more than 20 years ago; patient is not sure of pneumothorax versus thoracic endometriosis then.     She is now post menopausal.    Hospital Course :   Ms. Samayoa was admitted to monitored floor, she was seen by pulmonary.    Pleural effusion, left, very large  CTA c/a/p: very large left pleural effusion, no masses.  S/p thoracentesis in ER with removal of 800cc, sent for cytology (bloody tap) and culture, LD.  Pulm following.  Other workup sent by pulmonary.  As mentioned above she has history of right catamenial pneumothorax or thoracic endometriosis.  Unclear if this is recurring even she is postmenopausal.  Despite thoracentesis her chest remains unchanged.  Complete right hemithorax with leftward deviation of the mediastinum and heart.  IR consulted who recommended CT surgery evaluation.  Pulmonary Dr. Welch discussed with CT surgery at [facility].  Spoke with Dr. Patricio Riggins who has kindly accepted the patient to evaluate for VATS procedure.     Sepsis, source likely lung  Possible underlying pulmonary infection/pn  Started on Levaquin IV  Lactic acid now in normal range  Procalcitonin 0.18.  BlCx: sent.         Hyperglycermia,   New diagnosis of DM  A1C 9.6,   On SSI and POC ACHS.      Activity: activity as tolerated    Diet: regular

## 2024-11-19 LAB — CYTOLOGY-NON GYN: NORMAL

## 2024-11-19 NOTE — PROGRESS NOTES
Hospitalist Progress Note    Patient: Sisi Samayoa MRN: [id number]  CSN: [id number]    YOB: 1965  Age: 59 y.o.  Sex: female    DOA: 11/16/2024 LOS:  LOS: 0 days          Chief Complain :  WALLER  59 y.o.  female w/o significant PMH who presents with WALLER   Subjective:   Patient laying in bed, complains of difficulty breathing through nose.    Assessment/Plan     Active Hospital Problems    Diagnosis     Pleural effusion on right [J90]     WALLER (dyspnea on exertion) [R06.09]     Former smoker, stopped smoking in distant past [Z87.891]     Hyperglycemia [R73.9]           Pleural effusion, left, very large  CTA c/a/p: very large left pleural effusion, no masses.  S/p thoracentesis in ER with removal of 800cc, sent for cytology (bloody tap) and culture, LD.  Pulm following.     Sepsis, source likely lung  Possible underlying pulmonary infection/pn  On Levaquin IV  Lactic acid now in normal range  Check Procalcitonin.  BlCx: sent.  UA: reassuring.         Hyperglycermia,   New diagnosis of DM  A1C 9.6,   On SSI and POC ACHS.      DVT Prophylaxis:  [x]Lovenox SQ  [] Heparin SQ  [] SCDs  [] Coumadin, Eliquis, Xarelto,   [] On Heparin gtt or therapeutic Lovenox. [] Patient refused.       Case discussed with:  [x]Patient  []Family [x] Consultants  []Nursing  []Case Management         Discharge to;  [x] Home  [] Home Health  [] SNF/Rehab  [] LTAC. In 2 days    Review of systems : As above and,  General: No fevers or chills.  Cardiovascular: No chest pain or pressure. No palpitations.   Pulmonary: No shortness of breath.   Gastrointestinal: No nausea, vomiting.     Physical Exam: As above and,  General: Awake, cooperative, no acute 
0050 Patient arrived to unit from ER. Son at bedside.     0104 Patients last glucose reading was 317 on 11/16 at 1942. Glucose rechecked resulting 252. Advised patient of insulin protocol ordered. Patient refused. Patient expressed she was unsure about insulin and she is not diabetic. This nurses educated the patient on the use/ need for insulin. Patient understood. Insulin was held.     0704 Patient glucose 227. Patient refused insulin and requested to speak to doctor for further explanation of her treatment plans. Oncoming nurse made aware.   
I have spoke to [facility] thoracic surgery  Complex case of recurrent catamenial? Hemothorax ?  Dr. Patricio Riggins accepted patient to [facility] for evaluation for VATS vs large bore chest tube depending on the tests.  Accepting physician Dr. Patricio Riggins , I have discussed with Dr. Pierre Hospitalist  I spoke to patient in AM about possible transfer if accepted.  Patient does not have any risk of tuberculosis.  CT does not have appearance of tuberculosis at all.  No need to reapt thora ancelmo Ordonez MD      
Patient awake in bed. Family at bedside. No current complaints or needs.  
Patient prepared to transfer to [facility]. Medical transport present.   
Patient sleeping in bed. Awakens to name. Family at bedside. No current needs or complaints. Call light in reach.  
Physical Therapy Evaluation Attempt    Chart reviewed.  Pt deferred to participate in physical therapy session due to:    []  Eating meal  []  Nausea  []  Dizziness  []  Lethargy  []  Lab Results  []  Blood Transfusion  []  Dialysis  []  Pain  [x]  Other:  Pt in R side lying position upon arrival.  Able to introduce self and provide ed regarding PT role.  Pt requests not to participate in mobility aspect of evaluation as she states \"the only comfortable position is laying on R side and even then it's hard to breathe because of my stomach.  I can't explain it.\"  Encouraged pt to reposition in bed and perform B LE exercises.  Pt requests PT to return tomorrow.     Georgette Hendrickson PT   
Pt did not eat or had lovenox   Repeat thora    Saturday 800 ml bloody fluid removed but CXR not better   After CT of the chest to evaluate lung parenchyma  Send for cytology culture  Patient will require transfer to [facility] or any facility  for thoracic surgery evaluation   She has history of thoracic endometriosis and catamenial right sided pneumothorax 20 years ago and needs evaluation for VATS not chest tube  I will add TG and cholesterol and cytology  ANSLEY Ordonez MD    
current functional mobility deficits, it is recommended that the patient have 2-3 sessions per week of Physical Therapy at d/c to increase the patient's independence.    At this time and based on an AM-PAC score, no further PT is recommended upon discharge due to (i.e. patient at baseline functional status…etc…).  Recommend patient returns to prior setting with prior services.     SUBJECTIVE:   Patient stated: \"I don't feel good\"    OBJECTIVE DATA SUMMARY:   History reviewed. No pertinent past medical history.History reviewed. No pertinent surgical history.  Barriers to Learning/Limitations: none  Compensate with: visual, verbal, tactile, kinesthetic cues/model  Home Situation:  Social/Functional History  Lives With: Son  Type of Home: House  Home Layout: Two level  Home Access: Level entry  Bathroom Shower/Tub: Tub/Shower unit  Bathroom Toilet: Standard  Home Equipment: None  Occupation: Full time employment  Strength:    Strength: Generally decreased, functional  Tone & Sensation:   Tone: Normal  Sensation: Intact  Range Of Motion:  AROM: Within functional limits  PROM: Within functional limits  Functional Mobility:  Bed Mobility:   Bed Mobility Training  Bed Mobility Training: Yes  Supine to Sit: Supervision  Sit to Supine: Supervision  Balance:   Balance  Sitting: Intact  Pain:  Pain level pre-treatment: 0/10   Pain level post-treatment: 0/10   Pain Intervention(s): Medication (see MAR); Rest, Ice, Repositioning  Response to intervention: Nurse notified, See doc flow    Activity Tolerance:   Activity Tolerance: Patient tolerated evaluation without incident;Patient limited by fatigue;Patient limited by endurance (SOB)    After treatment:   []         Patient left in no apparent distress sitting up in chair  [x]         Patient left in no apparent distress in bed  [x]         Call bell left within reach  [x]         Nursing notified  []         Caregiver present  [x]         Bed alarm activated  []         SCDs 
10% 125 mL  125 mL IntraVENous PRN Joss Pathak DO        Or    dextrose bolus 10% 250 mL  250 mL IntraVENous PRN Joss Pathak DO        glucagon injection 1 mg  1 mg SubCUTAneous PRN Joss Pathak DO        dextrose 10 % infusion   IntraVENous Continuous PRN Joss Pathak DO        insulin lispro (HUMALOG,ADMELOG) injection vial 0-8 Units  0-8 Units SubCUTAneous 4x Daily AC & HS Joss Pathak DO   4 Units at 11/17/24 2031    mirtazapine (REMERON SOL-TAB) disintegrating tablet 7.5 mg  7.5 mg Oral Once Joss Pathak DO        mirtazapine (REMERON SOL-TAB) disintegrating tablet 15 mg  15 mg Oral Nightly Joss Pathak DO   15 mg at 11/17/24 2026    sodium chloride (OCEAN, BABY AYR) 0.65 % nasal spray 1 spray  1 spray Each Nostril PRN Hugh Pierre MD   1 spray at 11/17/24 1757    melatonin disintegrating tablet 10 mg  10 mg Oral Nightly Hugh Pierre MD   10 mg at 11/17/24 2025         Objective:   Intake/Output:     Intake/Output Summary (Last 24 hours) at 11/18/2024 1048  Last data filed at 11/18/2024 0700  Gross per 24 hour   Intake 1070.94 ml   Output 1050 ml   Net 20.94 ml       Vital Signs:    /88   Pulse (!) 108   Temp 97.9 °F (36.6 °C) (Oral)   Resp 16   Ht 1.753 m (5' 9\")   Wt 67.9 kg (149 lb 11.1 oz)   SpO2 100%   BMI 22.11 kg/m²     Weight:  Wt Readings from Last 3 Encounters:   11/17/24 67.9 kg (149 lb 11.1 oz)        Physical Exam:     General/Neurology: Alert, Awake, NAD.  Chronically ill looking.  Thin.  Room air.  Not in acute distress  Head:   Normocephalic, without obvious abnormality, atraumatic.  Eye:   EOM intact. No scleral icterus, no pallor, no cyanosis.  Nose:   No sinus tenderness  Throat:  Lips, mucosa, and tongue normal. No oral thrush.  Neck:   Supple, symmetric. No lymphadenopathy. Trachea midline  Lung:   No air entry in the right lung.  Good air entry in the left lung without any crackles, wheezing, prolonged

## 2024-11-20 LAB
M TB IFN-G BLD-IMP: ABNORMAL
M TB IFN-G CD4+ T-CELLS BLD-ACNC: 0.14 IU/ML
M TBIFN-G CD4+ CD8+T-CELLS BLD-ACNC: 0.05 IU/ML
QUANTIFERON CRITERIA: ABNORMAL
QUANTIFERON MITOGEN VALUE: 0.09 IU/ML
QUANTIFERON NIL VALUE: 0.05 IU/ML

## 2024-11-21 LAB
BACTERIA SPEC CULT: NORMAL
GRAM STN SPEC: NORMAL
GRAM STN SPEC: NORMAL
SERVICE CMNT-IMP: NORMAL

## 2024-11-22 LAB
ADENOSINE DEAMINASE PLR-CCNC: 25 U/L (ref 0–30)
BACTERIA SPEC CULT: NORMAL
BACTERIA SPEC CULT: NORMAL
SERVICE CMNT-IMP: NORMAL
SERVICE CMNT-IMP: NORMAL
SPECIMEN SOURCE: NORMAL

## 2024-11-23 LAB
ACID FAST STN SPEC: NORMAL
MYCOBACTERIUM SPEC QL CULT: NORMAL
SPECIMEN PREPARATION: NORMAL
SPECIMEN SOURCE: NORMAL

## 2024-11-25 LAB
BACTERIA SPEC CULT: NORMAL
SERVICE CMNT-IMP: NORMAL

## 2024-12-02 LAB
BACTERIA SPEC CULT: NORMAL
SERVICE CMNT-IMP: NORMAL

## 2024-12-09 LAB
BACTERIA SPEC CULT: NORMAL
SERVICE CMNT-IMP: NORMAL

## 2024-12-16 LAB
BACTERIA SPEC CULT: NORMAL
SERVICE CMNT-IMP: NORMAL

## 2025-01-07 LAB
ACID FAST STN SPEC: NORMAL
MYCOBACTERIUM SPEC QL CULT: NEGATIVE
SPECIMEN PREPARATION: NORMAL
SPECIMEN SOURCE: NORMAL

## 2025-03-20 ENCOUNTER — HOSPITAL ENCOUNTER (OUTPATIENT)
Facility: HOSPITAL | Age: 60
Discharge: HOME | End: 2025-03-23
Payer: OTHER GOVERNMENT

## 2025-03-20 DIAGNOSIS — Z78.9 PERSON LIVING IN RESIDENTIAL INSTITUTION: ICD-10-CM

## 2025-03-20 DIAGNOSIS — C34.91 PRIMARY LUNG CANCER WITH METASTASIS FROM LUNG TO OTHER SITE, RIGHT (HCC): ICD-10-CM

## 2025-03-20 LAB
EKG ATRIAL RATE: 109 BPM
EKG DIAGNOSIS: NORMAL
EKG P AXIS: 63 DEGREES
EKG P-R INTERVAL: 130 MS
EKG Q-T INTERVAL: 338 MS
EKG QRS DURATION: 74 MS
EKG QTC CALCULATION (BAZETT): 455 MS
EKG R AXIS: 79 DEGREES
EKG T AXIS: 32 DEGREES
EKG VENTRICULAR RATE: 109 BPM

## 2025-03-20 PROCEDURE — 93005 ELECTROCARDIOGRAM TRACING: CPT

## 2025-03-20 PROCEDURE — 93010 ELECTROCARDIOGRAM REPORT: CPT | Performed by: INTERNAL MEDICINE

## 2025-03-27 ENCOUNTER — APPOINTMENT (OUTPATIENT)
Facility: HOSPITAL | Age: 60
End: 2025-03-27
Payer: OTHER GOVERNMENT

## 2025-03-27 ENCOUNTER — HOSPITAL ENCOUNTER (EMERGENCY)
Facility: HOSPITAL | Age: 60
Discharge: HOME | End: 2025-03-27
Attending: EMERGENCY MEDICINE
Payer: OTHER GOVERNMENT

## 2025-03-27 VITALS
OXYGEN SATURATION: 100 % | DIASTOLIC BLOOD PRESSURE: 99 MMHG | WEIGHT: 125 LBS | SYSTOLIC BLOOD PRESSURE: 174 MMHG | RESPIRATION RATE: 23 BRPM | HEART RATE: 111 BPM | HEIGHT: 69 IN | TEMPERATURE: 97.9 F | BODY MASS INDEX: 18.51 KG/M2

## 2025-03-27 DIAGNOSIS — I10 HYPERTENSION, UNSPECIFIED TYPE: Primary | ICD-10-CM

## 2025-03-27 LAB
ALBUMIN SERPL-MCNC: 3.6 G/DL (ref 3.4–5)
ALBUMIN/GLOB SERPL: 1.1 (ref 0.8–1.7)
ALP SERPL-CCNC: 83 U/L (ref 45–117)
ALT SERPL-CCNC: 16 U/L (ref 13–56)
ANION GAP SERPL CALC-SCNC: 6 MMOL/L (ref 3–18)
APPEARANCE UR: CLEAR
AST SERPL-CCNC: 10 U/L (ref 10–38)
BACTERIA URNS QL MICRO: ABNORMAL /HPF
BASOPHILS # BLD: 0.01 K/UL (ref 0–0.1)
BASOPHILS NFR BLD: 0.2 % (ref 0–2)
BILIRUB SERPL-MCNC: 0.6 MG/DL (ref 0.2–1)
BILIRUB UR QL: NEGATIVE
BUN SERPL-MCNC: 7 MG/DL (ref 7–18)
BUN/CREAT SERPL: 14 (ref 12–20)
CALCIUM SERPL-MCNC: 9.7 MG/DL (ref 8.5–10.1)
CHLORIDE SERPL-SCNC: 104 MMOL/L (ref 100–111)
CO2 SERPL-SCNC: 28 MMOL/L (ref 21–32)
COLOR UR: YELLOW
CREAT SERPL-MCNC: 0.49 MG/DL (ref 0.6–1.3)
DIFFERENTIAL METHOD BLD: ABNORMAL
EKG ATRIAL RATE: 115 BPM
EKG DIAGNOSIS: NORMAL
EKG P AXIS: 60 DEGREES
EKG P-R INTERVAL: 130 MS
EKG Q-T INTERVAL: 326 MS
EKG QRS DURATION: 68 MS
EKG QTC CALCULATION (BAZETT): 450 MS
EKG R AXIS: 48 DEGREES
EKG T AXIS: -8 DEGREES
EKG VENTRICULAR RATE: 115 BPM
EOSINOPHIL # BLD: 0.03 K/UL (ref 0–0.4)
EOSINOPHIL NFR BLD: 0.5 % (ref 0–5)
EPITH CASTS URNS QL MICRO: ABNORMAL /LPF (ref 0–5)
ERYTHROCYTE [DISTWIDTH] IN BLOOD BY AUTOMATED COUNT: 15.2 % (ref 11.6–14.5)
GLOBULIN SER CALC-MCNC: 3.4 G/DL (ref 2–4)
GLUCOSE SERPL-MCNC: 171 MG/DL (ref 74–99)
GLUCOSE UR STRIP.AUTO-MCNC: NEGATIVE MG/DL
HCT VFR BLD AUTO: 37.5 % (ref 35–45)
HGB BLD-MCNC: 11.6 G/DL (ref 12–16)
HGB UR QL STRIP: NEGATIVE
IMM GRANULOCYTES # BLD AUTO: 0.02 K/UL (ref 0–0.04)
IMM GRANULOCYTES NFR BLD AUTO: 0.3 % (ref 0–0.5)
KETONES UR QL STRIP.AUTO: NEGATIVE MG/DL
LEUKOCYTE ESTERASE UR QL STRIP.AUTO: ABNORMAL
LYMPHOCYTES # BLD: 0.78 K/UL (ref 0.9–3.3)
LYMPHOCYTES NFR BLD: 13 % (ref 21–52)
MAGNESIUM SERPL-MCNC: 2.1 MG/DL (ref 1.6–2.6)
MCH RBC QN AUTO: 24.7 PG (ref 24–34)
MCHC RBC AUTO-ENTMCNC: 30.9 G/DL (ref 31–37)
MCV RBC AUTO: 79.8 FL (ref 78–100)
MONOCYTES # BLD: 0.53 K/UL (ref 0.05–1.2)
MONOCYTES NFR BLD: 8.8 % (ref 3–10)
MUCOUS THREADS URNS QL MICRO: ABNORMAL /LPF
NEUTS SEG # BLD: 4.65 K/UL (ref 1.8–8)
NEUTS SEG NFR BLD: 77.2 % (ref 40–73)
NITRITE UR QL STRIP.AUTO: NEGATIVE
NRBC # BLD: 0 K/UL (ref 0–0.01)
NRBC BLD-RTO: 0 PER 100 WBC
PH UR STRIP: 6.5 (ref 5–8)
PLATELET # BLD AUTO: 396 K/UL (ref 135–420)
PMV BLD AUTO: 8.9 FL (ref 9.2–11.8)
POTASSIUM SERPL-SCNC: 4 MMOL/L (ref 3.5–5.5)
PROT SERPL-MCNC: 7 G/DL (ref 6.4–8.2)
PROT UR STRIP-MCNC: NEGATIVE MG/DL
RBC # BLD AUTO: 4.7 M/UL (ref 4.2–5.3)
RBC #/AREA URNS HPF: ABNORMAL /HPF (ref 0–5)
SODIUM SERPL-SCNC: 138 MMOL/L (ref 136–145)
SP GR UR REFRACTOMETRY: 1.01 (ref 1–1.03)
TROPONIN I SERPL HS-MCNC: 7 NG/L (ref 0–54)
UROBILINOGEN UR QL STRIP.AUTO: 1 EU/DL (ref 0.2–1)
WBC # BLD AUTO: 6 K/UL (ref 4.6–13.2)
WBC URNS QL MICRO: ABNORMAL /HPF (ref 0–5)

## 2025-03-27 PROCEDURE — 80053 COMPREHEN METABOLIC PANEL: CPT

## 2025-03-27 PROCEDURE — 2580000003 HC RX 258: Performed by: EMERGENCY MEDICINE

## 2025-03-27 PROCEDURE — 93010 ELECTROCARDIOGRAM REPORT: CPT | Performed by: INTERNAL MEDICINE

## 2025-03-27 PROCEDURE — 99285 EMERGENCY DEPT VISIT HI MDM: CPT

## 2025-03-27 PROCEDURE — 83735 ASSAY OF MAGNESIUM: CPT

## 2025-03-27 PROCEDURE — 94762 N-INVAS EAR/PLS OXIMTRY CONT: CPT

## 2025-03-27 PROCEDURE — 81001 URINALYSIS AUTO W/SCOPE: CPT

## 2025-03-27 PROCEDURE — 71045 X-RAY EXAM CHEST 1 VIEW: CPT

## 2025-03-27 PROCEDURE — 84484 ASSAY OF TROPONIN QUANT: CPT

## 2025-03-27 PROCEDURE — 85025 COMPLETE CBC W/AUTO DIFF WBC: CPT

## 2025-03-27 PROCEDURE — 93005 ELECTROCARDIOGRAM TRACING: CPT | Performed by: EMERGENCY MEDICINE

## 2025-03-27 RX ORDER — 0.9 % SODIUM CHLORIDE 0.9 %
1000 INTRAVENOUS SOLUTION INTRAVENOUS ONCE
Status: COMPLETED | OUTPATIENT
Start: 2025-03-27 | End: 2025-03-27

## 2025-03-27 RX ADMIN — SODIUM CHLORIDE 1000 ML: 900 INJECTION, SOLUTION INTRAVENOUS at 08:04

## 2025-03-27 ASSESSMENT — LIFESTYLE VARIABLES
HOW MANY STANDARD DRINKS CONTAINING ALCOHOL DO YOU HAVE ON A TYPICAL DAY: PATIENT DOES NOT DRINK
HOW OFTEN DO YOU HAVE A DRINK CONTAINING ALCOHOL: NEVER

## 2025-03-27 ASSESSMENT — PAIN - FUNCTIONAL ASSESSMENT: PAIN_FUNCTIONAL_ASSESSMENT: NONE - DENIES PAIN

## 2025-03-27 NOTE — ED TRIAGE NOTES
Pt alert and conversational. Ambulated to triage. C/O hypertension and rapid heart rate since last night. Pt had port placed yesterday.   Receiving chemo for lung cancer    Active Ambulatory Problems     Diagnosis Date Noted    Pleural effusion on right 11/17/2024    WALLER (dyspnea on exertion) 11/17/2024    Former smoker, stopped smoking in distant past 11/17/2024    Hyperglycemia 11/17/2024    Endometriosis of thorax 11/18/2024    History of pneumothorax 11/18/2024     Resolved Ambulatory Problems     Diagnosis Date Noted    No Resolved Ambulatory Problems     No Additional Past Medical History

## 2025-03-27 NOTE — ED PROVIDER NOTES
[facility] EMERGENCY DEPT  EMERGENCY DEPARTMENT ENCOUNTER    Patient Name: Sisi Samayoa  MRN: [id number]  YOB: 1965  Provider: Jean Claude Hearn MD  PCP: Lidia Pierre APRN - NP   Time/Date of evaluation: 7:48 AM EDT on 3/27/25    History of Presenting Illness     Chief Complaint   Patient presents with    Hypertension    Tachycardia       History Provided by: Patient   History is limited by: Nothing    HISTORY (Narative):   Sisi Samayoa is a 60 y.o. female with a PMHX of lung cancer, port placement  who presents to the emergency department (room 3) by POV C/O elevated blood pressure onset yesterday. Associated sxs include rapid heart rate. Patient denies fever, chills, nausea, vomiting, shortness of breath or any other sxs or complaints.  Patient was recently diagnosed with lung cancer in November 2024.  Patient started chemotherapy this month.  Patient had a right sided port placed for chemo yesterday by Dr. Patel.  Patient takes her blood pressure twice a day and yesterday was elevated.  She does not have a diagnosis of hypertension and does not normally get high blood pressure.  Patient states her heart rate is up but she has had elevated heart rates in the  range since being diagnosed with cancer.    Nursing Notes were all reviewed and agreed with or any disagreements were addressed in the HPI.    Past History     PAST MEDICAL HISTORY:  No past medical history on file.    PAST SURGICAL HISTORY:  No past surgical history on file.    FAMILY HISTORY:  No family history on file.    SOCIAL HISTORY:  Social History     Tobacco Use    Smoking status: Never    Smokeless tobacco: Never   Substance Use Topics    Alcohol use: Never    Drug use: Never       MEDICATIONS:  No current facility-administered medications for this encounter.     Current Outpatient Medications   Medication Sig Dispense Refill    insulin lispro (HUMALOG,ADMELOG) 100 UNIT/ML SOLN injection vial Inject 0-8 Units into the  - 2.6 mg/dL   EKG 12 Lead    Collection Time: 03/27/25  7:53 AM   Result Value Ref Range    Ventricular Rate 115 BPM    Atrial Rate 115 BPM    P-R Interval 130 ms    QRS Duration 68 ms    Q-T Interval 326 ms    QTc Calculation (Bazett) 450 ms    P Axis 60 degrees    R Axis 48 degrees    T Axis -8 degrees    Diagnosis       Sinus tachycardia  Left atrial enlargement  Nonspecific T wave abnormality  Abnormal ECG  When compared with ECG of 20-MAR-2025 08:17,  No significant change was found     Urinalysis    Collection Time: 03/27/25  8:48 AM   Result Value Ref Range    Color, UA YELLOW      Appearance CLEAR      Specific Gravity, UA 1.008 1.005 - 1.030      pH, Urine 6.5 5.0 - 8.0      Protein, UA Negative NEG mg/dL    Glucose, Ur Negative NEG mg/dL    Ketones, Urine Negative NEG mg/dL    Bilirubin, Urine Negative NEG      Blood, Urine Negative NEG      Urobilinogen, Urine 1.0 0.2 - 1.0 EU/dL    Nitrite, Urine Negative NEG      Leukocyte Esterase, Urine TRACE (A) NEG     Urinalysis, Micro    Collection Time: 03/27/25  8:48 AM   Result Value Ref Range    WBC, UA 0-3 0 - 5 /hpf    RBC, UA 0-3 0 - 5 /hpf    Epithelial Cells, UA FEW 0 - 5 /lpf    BACTERIA, URINE FEW (A) NEG /hpf    Mucus, UA 1+ (A) NEG /lpf       RADIOLOGIC STUDIES:   Non x-ray images such as CT, Ultrasound and MRI are read by the radiologist. X-ray images are visualized and preliminarily interpreted by the ED Provider with the findings as listed in the ED Course section below.     Interpretation per the Radiologist is listed below, if available at the time of this note:    XR CHEST PORTABLE   Final Result   1. Right IJ port with with positioning detailed above      2. Decreased loculated right pleural effusion. Right fullness of the right hilum            Electronically signed by Iztel Connor          Procedures     Procedures    ED Course     7:48 AM EDT I (Jean Claude Hearn MD) am the first provider for this patient. Initial assessment performed. I

## 2025-03-27 NOTE — DISCHARGE INSTRUCTIONS
Thank you for choosing [facility] Emergency Department for your care. It is our privilege to provide excellent care for you in your time of need. In the next several days, you may receive a survey via mail or email about your experience with our team. We would appreciate you taking a few minutes to complete the survey, as we use this information to learn what we have done well and what we could be doing better. Thank you for trusting us with your care.    -----------------------------------------------------------------------------  Below you will find a list of your tests from today's visit.   Labs  Recent Results (from the past 12 hours)   Troponin    Collection Time: 03/27/25  7:01 AM   Result Value Ref Range    Troponin, High Sensitivity 7 0 - 54 ng/L   CBC with Auto Differential    Collection Time: 03/27/25  7:41 AM   Result Value Ref Range    WBC 6.0 4.6 - 13.2 K/uL    RBC 4.70 4.20 - 5.30 M/uL    Hemoglobin 11.6 (L) 12.0 - 16.0 g/dL    Hematocrit 37.5 35.0 - 45.0 %    MCV 79.8 78.0 - 100.0 FL    MCH 24.7 24.0 - 34.0 PG    MCHC 30.9 (L) 31.0 - 37.0 g/dL    RDW 15.2 (H) 11.6 - 14.5 %    Platelets 396 135 - 420 K/uL    MPV 8.9 (L) 9.2 - 11.8 FL    Nucleated RBCs 0.0 0  WBC    nRBC 0.00 0.00 - 0.01 K/uL    Neutrophils % 77.2 (H) 40.0 - 73.0 %    Lymphocytes % 13.0 (L) 21.0 - 52.0 %    Monocytes % 8.8 3.0 - 10.0 %    Eosinophils % 0.5 0.0 - 5.0 %    Basophils % 0.2 0.0 - 2.0 %    Immature Granulocytes % 0.3 0.0 - 0.5 %    Neutrophils Absolute 4.65 1.80 - 8.00 K/UL    Lymphocytes Absolute 0.78 (L) 0.90 - 3.30 K/UL    Monocytes Absolute 0.53 0.05 - 1.20 K/UL    Eosinophils Absolute 0.03 0.00 - 0.40 K/UL    Basophils Absolute 0.01 0.00 - 0.10 K/UL    Immature Granulocytes Absolute 0.02 0.00 - 0.04 K/UL    Differential Type AUTOMATED     Comprehensive Metabolic Panel    Collection Time: 03/27/25  7:41 AM   Result Value Ref Range    Sodium 138 136 - 145 mmol/L    Potassium 4.0 3.5 - 5.5  mmol/L    Chloride 104 100 - 111 mmol/L    CO2 28 21 - 32 mmol/L    Anion Gap 6 3.0 - 18 mmol/L    Glucose 171 (H) 74 - 99 mg/dL    BUN 7 7.0 - 18 MG/DL    Creatinine 0.49 (L) 0.6 - 1.3 MG/DL    BUN/Creatinine Ratio 14 12 - 20      Est, Glom Filt Rate >90 >60 ml/min/1.73m2    Calcium 9.7 8.5 - 10.1 MG/DL    Total Bilirubin 0.6 0.2 - 1.0 MG/DL    ALT 16 13 - 56 U/L    AST 10 10 - 38 U/L    Alk Phosphatase 83 45 - 117 U/L    Total Protein 7.0 6.4 - 8.2 g/dL    Albumin 3.6 3.4 - 5.0 g/dL    Globulin 3.4 2.0 - 4.0 g/dL    Albumin/Globulin Ratio 1.1 0.8 - 1.7     Magnesium    Collection Time: 03/27/25  7:41 AM   Result Value Ref Range    Magnesium 2.1 1.6 - 2.6 mg/dL   EKG 12 Lead    Collection Time: 03/27/25  7:53 AM   Result Value Ref Range    Ventricular Rate 115 BPM    Atrial Rate 115 BPM    P-R Interval 130 ms    QRS Duration 68 ms    Q-T Interval 326 ms    QTc Calculation (Bazett) 450 ms    P Axis 60 degrees    R Axis 48 degrees    T Axis -8 degrees    Diagnosis       Sinus tachycardia  Left atrial enlargement  Nonspecific T wave abnormality  Abnormal ECG  When compared with ECG of 20-MAR-2025 08:17,  No significant change was found     Urinalysis    Collection Time: 03/27/25  8:48 AM   Result Value Ref Range    Color, UA YELLOW      Appearance CLEAR      Specific Gravity, UA 1.008 1.005 - 1.030      pH, Urine 6.5 5.0 - 8.0      Protein, UA Negative NEG mg/dL    Glucose, Ur Negative NEG mg/dL    Ketones, Urine Negative NEG mg/dL    Bilirubin, Urine Negative NEG      Blood, Urine Negative NEG      Urobilinogen, Urine 1.0 0.2 - 1.0 EU/dL    Nitrite, Urine Negative NEG      Leukocyte Esterase, Urine TRACE (A) NEG     Urinalysis, Micro    Collection Time: 03/27/25  8:48 AM   Result Value Ref Range    WBC, UA 0-3 0 - 5 /hpf    RBC, UA 0-3 0 - 5 /hpf    Epithelial Cells, UA FEW 0 - 5 /lpf    BACTERIA, URINE FEW (A) NEG /hpf    Mucus, UA 1+ (A) NEG /lpf        Radiologic Studies  XR CHEST PORTABLE   Final Result   1.